# Patient Record
Sex: MALE
[De-identification: names, ages, dates, MRNs, and addresses within clinical notes are randomized per-mention and may not be internally consistent; named-entity substitution may affect disease eponyms.]

---

## 2021-12-22 ENCOUNTER — HOSPITAL ENCOUNTER (OUTPATIENT)
Dept: HOSPITAL 5 - MAMMO | Age: 71
Discharge: HOME | End: 2021-12-22
Attending: PHYSICIAN ASSISTANT
Payer: MEDICARE

## 2021-12-22 DIAGNOSIS — R93.7: ICD-10-CM

## 2021-12-22 DIAGNOSIS — M81.0: Primary | ICD-10-CM

## 2021-12-22 PROCEDURE — 77080 DXA BONE DENSITY AXIAL: CPT

## 2021-12-22 NOTE — MAMMOGRAPHY REPORT
DEXA BONE DENSITY SCAN



INDICATION / CLINICAL INFORMATION: R93.7.

71 years Male



COMPARISON: None available.



LUMBAR SPINE, L1-L4:

- Bone mineral density (BMD) = 0.714 g/cm2.

- T-score = -3.4 

- Z-score = -2.5 



Change (%) since most recent prior (if available):  None available.





LEFT HIP, NECK :

- Bone mineral density (BMD) = 0.455 g/cm2.

- T-score = -3.5 

- Z-score = -2.3 



Change (%) since most recent prior (if available):  None available.







IMPRESSION:

1. WHO Classification: Osteoporosis. Fracture Risk: High. 

2. 10-Year Fracture Risk (FRAX) = Major Osteoporotic Not reported.% / Hip: Not reported.%





FRAX generally not reported for patients with normal or osteoporotic BMD, in non-steroid-treated phylicia
ents younger than age 50, or in patients undergoing pharmacotherapy.





=================================================================

BMD Reporting Guidelines (ISCD, 2015)

=================================================================

BMD Reporting in Postmenopausal Women and in Men Age 50 and Older

- T-scores are preferred.

- The WHO densitometric classification is applicable.



BMD Reporting in Females Prior to Menopause and in Males Younger Than Age 50

- Z-scores, not T-scores, are preferred. This is particularly important in children.

- A Z-score of -2.0 or lower is defined as below the expected range for age, and a Z-score above -2.0
 is within the expected range for age.

- Osteoporosis cannot be diagnosed in men under age 50 on the basis of BMD alone.

- The WHO diagnostic criteria may be applied to women in the menopausal transition.





http://www.iscd.org/official-positions/2784-zumh-kdjlklph-positions-adult/





Signer Name: Freeman Girard MD 

Signed: 12/22/2021 3:13 PM

Workstation Name: [id number]